# Patient Record
Sex: MALE | Race: WHITE | Employment: OTHER | URBAN - METROPOLITAN AREA
[De-identification: names, ages, dates, MRNs, and addresses within clinical notes are randomized per-mention and may not be internally consistent; named-entity substitution may affect disease eponyms.]

---

## 2018-05-27 ENCOUNTER — HOSPITAL ENCOUNTER (EMERGENCY)
Age: 58
Discharge: HOME | End: 2018-05-27
Attending: EMERGENCY MEDICINE
Payer: COMMERCIAL

## 2018-05-27 ENCOUNTER — APPOINTMENT (OUTPATIENT)
Dept: GENERAL RADIOLOGY | Age: 58
End: 2018-05-27
Payer: COMMERCIAL

## 2018-05-27 VITALS
RESPIRATION RATE: 16 BRPM | DIASTOLIC BLOOD PRESSURE: 86 MMHG | OXYGEN SATURATION: 95 % | HEART RATE: 68 BPM | BODY MASS INDEX: 39.96 KG/M2 | HEIGHT: 72 IN | SYSTOLIC BLOOD PRESSURE: 162 MMHG | TEMPERATURE: 98.3 F | WEIGHT: 295 LBS

## 2018-05-27 DIAGNOSIS — S86.911A STRAIN OF RIGHT KNEE, INITIAL ENCOUNTER: ICD-10-CM

## 2018-05-27 DIAGNOSIS — M25.561 ACUTE PAIN OF RIGHT KNEE: Primary | ICD-10-CM

## 2018-05-27 PROCEDURE — 73564 X-RAY EXAM KNEE 4 OR MORE: CPT

## 2018-05-27 PROCEDURE — 99283 EMERGENCY DEPT VISIT LOW MDM: CPT

## 2018-05-27 RX ORDER — NAPROXEN 500 MG/1
500 TABLET ORAL 2 TIMES DAILY
Qty: 30 TABLET | Refills: 0 | Status: SHIPPED | OUTPATIENT
Start: 2018-05-27 | End: 2018-06-11

## 2018-05-27 RX ORDER — HYDROCODONE BITARTRATE AND ACETAMINOPHEN 5; 325 MG/1; MG/1
1 TABLET ORAL EVERY 6 HOURS PRN
Qty: 12 TABLET | Refills: 0 | Status: SHIPPED | OUTPATIENT
Start: 2018-05-27 | End: 2018-05-30

## 2018-05-27 ASSESSMENT — PAIN DESCRIPTION - DESCRIPTORS: DESCRIPTORS: DISCOMFORT

## 2018-05-27 ASSESSMENT — PAIN DESCRIPTION - FREQUENCY: FREQUENCY: INTERMITTENT

## 2018-05-27 ASSESSMENT — PAIN SCALES - GENERAL: PAINLEVEL_OUTOF10: 1

## 2018-05-27 ASSESSMENT — PAIN DESCRIPTION - LOCATION: LOCATION: KNEE

## 2018-05-27 ASSESSMENT — PAIN DESCRIPTION - ORIENTATION: ORIENTATION: RIGHT

## 2018-05-28 ASSESSMENT — ENCOUNTER SYMPTOMS
ABDOMINAL PAIN: 0
SHORTNESS OF BREATH: 0
EYE REDNESS: 0
NAUSEA: 0
VOMITING: 0

## 2019-07-20 ENCOUNTER — HOSPITAL ENCOUNTER (EMERGENCY)
Age: 59
Discharge: HOME | End: 2019-07-20
Attending: EMERGENCY MEDICINE
Payer: COMMERCIAL

## 2019-07-20 VITALS
BODY MASS INDEX: 40.63 KG/M2 | SYSTOLIC BLOOD PRESSURE: 134 MMHG | OXYGEN SATURATION: 95 % | DIASTOLIC BLOOD PRESSURE: 82 MMHG | TEMPERATURE: 98.3 F | RESPIRATION RATE: 16 BRPM | WEIGHT: 300 LBS | HEART RATE: 73 BPM | HEIGHT: 72 IN

## 2019-07-20 DIAGNOSIS — R21 RASH AND OTHER NONSPECIFIC SKIN ERUPTION: Primary | ICD-10-CM

## 2019-07-20 PROCEDURE — 6370000000 HC RX 637 (ALT 250 FOR IP): Performed by: EMERGENCY MEDICINE

## 2019-07-20 PROCEDURE — 99282 EMERGENCY DEPT VISIT SF MDM: CPT

## 2019-07-20 RX ORDER — METHYLPREDNISOLONE 4 MG/1
TABLET ORAL
Qty: 1 KIT | Refills: 0 | Status: SHIPPED | OUTPATIENT
Start: 2019-07-20 | End: 2019-07-26

## 2019-07-20 RX ORDER — CEPHALEXIN 500 MG/1
500 CAPSULE ORAL 4 TIMES DAILY
Qty: 40 CAPSULE | Refills: 0 | Status: SHIPPED | OUTPATIENT
Start: 2019-07-20 | End: 2019-07-30

## 2019-07-20 RX ORDER — CEPHALEXIN 500 MG/1
500 CAPSULE ORAL ONCE
Status: COMPLETED | OUTPATIENT
Start: 2019-07-20 | End: 2019-07-20

## 2019-07-20 RX ORDER — PREDNISONE 20 MG/1
60 TABLET ORAL ONCE
Status: COMPLETED | OUTPATIENT
Start: 2019-07-20 | End: 2019-07-20

## 2019-07-20 RX ADMIN — PREDNISONE 60 MG: 20 TABLET ORAL at 07:40

## 2019-07-20 RX ADMIN — CEPHALEXIN 500 MG: 500 CAPSULE ORAL at 07:41

## 2019-07-20 ASSESSMENT — PAIN DESCRIPTION - DESCRIPTORS: DESCRIPTORS: CONSTANT;DISCOMFORT;SORE

## 2019-07-20 ASSESSMENT — PAIN DESCRIPTION - PROGRESSION: CLINICAL_PROGRESSION: NOT CHANGED

## 2019-07-20 ASSESSMENT — PAIN DESCRIPTION - PAIN TYPE: TYPE: ACUTE PAIN

## 2019-07-20 ASSESSMENT — PAIN DESCRIPTION - FREQUENCY: FREQUENCY: CONTINUOUS

## 2019-07-20 ASSESSMENT — PAIN DESCRIPTION - LOCATION: LOCATION: HAND

## 2019-07-20 ASSESSMENT — PAIN DESCRIPTION - ONSET: ONSET: GRADUAL

## 2019-07-20 ASSESSMENT — PAIN DESCRIPTION - ORIENTATION: ORIENTATION: LEFT

## 2020-08-21 ENCOUNTER — APPOINTMENT (OUTPATIENT)
Dept: GENERAL RADIOLOGY | Age: 60
End: 2020-08-21
Payer: COMMERCIAL

## 2020-08-21 ENCOUNTER — HOSPITAL ENCOUNTER (EMERGENCY)
Age: 60
Discharge: HOME | End: 2020-08-21
Payer: COMMERCIAL

## 2020-08-21 VITALS
TEMPERATURE: 97.6 F | DIASTOLIC BLOOD PRESSURE: 76 MMHG | HEART RATE: 84 BPM | OXYGEN SATURATION: 98 % | SYSTOLIC BLOOD PRESSURE: 134 MMHG | WEIGHT: 280 LBS | RESPIRATION RATE: 16 BRPM | BODY MASS INDEX: 37.93 KG/M2 | HEIGHT: 72 IN

## 2020-08-21 PROCEDURE — 99284 EMERGENCY DEPT VISIT MOD MDM: CPT

## 2020-08-21 PROCEDURE — U0003 INFECTIOUS AGENT DETECTION BY NUCLEIC ACID (DNA OR RNA); SEVERE ACUTE RESPIRATORY SYNDROME CORONAVIRUS 2 (SARS-COV-2) (CORONAVIRUS DISEASE [COVID-19]), AMPLIFIED PROBE TECHNIQUE, MAKING USE OF HIGH THROUGHPUT TECHNOLOGIES AS DESCRIBED BY CMS-2020-01-R: HCPCS

## 2020-08-21 PROCEDURE — 96372 THER/PROPH/DIAG INJ SC/IM: CPT

## 2020-08-21 PROCEDURE — 71045 X-RAY EXAM CHEST 1 VIEW: CPT

## 2020-08-21 PROCEDURE — 6370000000 HC RX 637 (ALT 250 FOR IP): Performed by: EMERGENCY MEDICINE

## 2020-08-21 PROCEDURE — 6370000000 HC RX 637 (ALT 250 FOR IP): Performed by: NURSE PRACTITIONER

## 2020-08-21 PROCEDURE — 6360000002 HC RX W HCPCS: Performed by: EMERGENCY MEDICINE

## 2020-08-21 RX ORDER — ALBUTEROL SULFATE 90 UG/1
2 AEROSOL, METERED RESPIRATORY (INHALATION) 4 TIMES DAILY PRN
Qty: 1 INHALER | Refills: 0 | Status: SHIPPED | OUTPATIENT
Start: 2020-08-21

## 2020-08-21 RX ORDER — PREDNISONE 20 MG/1
60 TABLET ORAL ONCE
Status: COMPLETED | OUTPATIENT
Start: 2020-08-21 | End: 2020-08-21

## 2020-08-21 RX ORDER — MONTELUKAST SODIUM 10 MG/1
10 TABLET ORAL ONCE
Status: COMPLETED | OUTPATIENT
Start: 2020-08-21 | End: 2020-08-21

## 2020-08-21 RX ORDER — IPRATROPIUM BROMIDE AND ALBUTEROL SULFATE 2.5; .5 MG/3ML; MG/3ML
2 SOLUTION RESPIRATORY (INHALATION) ONCE
Status: DISCONTINUED | OUTPATIENT
Start: 2020-08-21 | End: 2020-08-21

## 2020-08-21 RX ORDER — PREDNISONE 10 MG/1
TABLET ORAL
Qty: 30 TABLET | Refills: 0 | Status: SHIPPED | OUTPATIENT
Start: 2020-08-21 | End: 2020-08-31

## 2020-08-21 RX ORDER — TERBUTALINE SULFATE 1 MG/ML
0.25 INJECTION, SOLUTION SUBCUTANEOUS ONCE
Status: COMPLETED | OUTPATIENT
Start: 2020-08-21 | End: 2020-08-21

## 2020-08-21 RX ADMIN — MONTELUKAST SODIUM 10 MG: 10 TABLET, FILM COATED ORAL at 13:08

## 2020-08-21 RX ADMIN — TERBUTALINE SULFATE 0.25 MG: 1 INJECTION SUBCUTANEOUS at 13:07

## 2020-08-21 RX ADMIN — PREDNISONE 60 MG: 20 TABLET ORAL at 12:18

## 2020-08-21 NOTE — ED PROVIDER NOTES
[facility]     Department of Emergency Medicine   ED  Provider Note  Admit Date/RoomTime: 8/21/2020 11:06 AM  ED Room: 31/31   Chief Complaint:   Concern For COVID-19 (states he has generalized body aches, camacho to lower back. denies fever, chest pain, cough. called PCP and was told to come to ER to be evaluated )    History of Present Illness   Source of history provided by:  patient. History/Exam Limitations: none. Sandra Aguila is a 60 y.o. old male who has a past medical Hx of:   Past Medical History:   Diagnosis Date    Gout     Hyperlipidemia     Hypertension     Sleep apnea     presents to the emergency department by private vehicle, for fever, which began 1 day(s) prior to arrival.  The fever is described as: low grade fevers and chills and measured orally. Since onset the symptoms have been stable. His symptoms are associated with wheezing. There has been NO history of cough, dizziness, nasal congestion or nausea. He has been treated with nothing prior to arrival.  Patient states he was sent in by his family doctor to rule out COVID-19. N/A  ROS    Pertinent positives and negatives are stated within HPI, all other systems reviewed and are negative. Past Surgical History:   Procedure Laterality Date    ANUS SURGERY      anal fistula repair    FRACTURE SURGERY      right lower leg    NASAL SINUS SURGERY      x 6    QUADRACEPS TENDON REPAIR     Social History:  reports that he has never smoked. He has never used smokeless tobacco. He reports that he does not drink alcohol or use drugs. Family History: family history is not on file.   Allergies: Minocycline    Physical Exam           ED Triage Vitals   BP Temp Temp src Pulse Resp SpO2 Height Weight   08/21/20 1114 08/21/20 1105 -- 08/21/20 1105 08/21/20 1114 08/21/20 1105 08/21/20 1114 08/21/20 1114   (!) 146/73 97.3 °F (36.3 °C)  96 20 97 % 6' (1.829 m) 280 lb (127 kg)     Oxygen Saturation Interpretation: Normal.    Constitutional: Alert, development consistent with age. HEENT:  NC/NT. Airway patent. Neck:  Normal ROM. Supple. Respiratory:  Clear to auscultation and breath sounds equal, slight expiratory wheezes noted bilaterally  CV:  Regular rate and rhythm, normal heart sounds, without pathological murmurs, ectopy, gallops, or rubs. GI: Abdomen Soft, nontender, good bowel sounds. No firm or pulsatile mass. Back:  No costovertebral tenderness. Integument:  Normal turgor. Warm, dry, without visible rash, unless noted elsewhere. Lymphatic: no lymphadenopathy noted  Neurological:  Oriented. Motor functions intact. Lab / Imaging Results   (All laboratory and radiology results have been personally reviewed by myself)  Labs:  Results for orders placed or performed during the hospital encounter of 08/21/20   Covid-19 Ambulatory   Result Value Ref Range    Source OP swab      Imaging: All Radiology results interpreted by Radiologist unless otherwise noted. XR CHEST PORTABLE   Final Result      NO ACUTE CARDIOPULMONARY PROCESS               ED Course / Medical Decision Making     Medications   predniSONE (DELTASONE) tablet 60 mg (60 mg Oral Given 8/21/20 1218)   terbutaline (BRETHINE) injection 0.25 mg (0.25 mg Subcutaneous Given 8/21/20 1307)   montelukast (SINGULAIR) tablet 10 mg (10 mg Oral Given 8/21/20 1308)        Re-Evaluations:  8/21/20      Time:     Patients symptoms are improving. Consultations:             None    Procedures:   none    MDM: Patient is a 79-year-old male came to the emergency department with complaints of a fever and chills that he has had since yesterday. Patient's chest x-ray showed no acute cardiopulmonary process. Patient was given terbutaline as well as Singulair and prednisone in the emergency department. He does have a pending COVID-19 test at this time. He will be discharged and is to follow-up his primary care provider.   Instructions were given to patient to self quarantine himself until his COVID-19 test results are back. I did inform him he started experiencing any type of shortness of breath that he is to return to emergency room immediately. Counseling:   I have spoken with the patient and discussed todays results, in addition to providing specific details for the plan of care and counseling regarding the diagnosis and prognosis and are agreeable with the plan to be discharged. Assessment      1. Fever, unspecified fever cause      This patient's ED course included: a personal history and physicial examination    This patient has remained hemodynamically stable during their ED course. Plan   Discharge to home. Patient condition is good. New Medications     Discharge Medication List as of 8/21/2020  1:01 PM      START taking these medications    Details   predniSONE (DELTASONE) 10 MG tablet 5 tablets by mouth once a day x 2 days, then 4 tablets by mouth once a day x 2 days, then 3 tablets by mouth once a day x 2 days, then 2 tablets by mouth once a day x 2 days, then 1 tablets by mouth once a day x 2 days then stop., Disp-30 tablet,R-0Prin t      albuterol sulfate HFA (VENTOLIN HFA) 108 (90 Base) MCG/ACT inhaler Inhale 2 puffs into the lungs 4 times daily as needed for Wheezing, Disp-1 Inhaler,R-0Print           Electronically signed by LAURA Ritchie NP   DD: 8/21/20  **This report was transcribed using voice recognition software. Every effort was made to ensure accuracy; however, inadvertent computerized transcription errors may be present.   END OF PROVIDER NOTE        LAURA Howard NP  08/21/20 7549

## 2020-08-21 NOTE — ED NOTES
Pt alert and oriented x4. Speech clear. Respirations easy/unlabored. Skin warm/dry. Appropriate color. No signs of acute distress noted. Pt/family teaching provided; verbalized understanding. Pt stable for discharge.        Adia Anne RN  08/21/20 6553

## 2020-08-22 ENCOUNTER — CARE COORDINATION (OUTPATIENT)
Dept: CARE COORDINATION | Age: 60
End: 2020-08-22

## 2020-08-22 LAB
SARS-COV-2: NOT DETECTED
SOURCE: NORMAL

## 2020-08-22 NOTE — CARE COORDINATION
Patient contacted regarding Marce Vidal. Discussed COVID-19 related testing which was pending at this time. Test results were pending. Patient informed of results, if available? pending    Care Transition Nurse/ Ambulatory Care Manager contacted the patient by telephone to perform post discharge assessment. Verified name and  with patient as identifiers. Provided introduction to self, and explanation of the CTN/ACM role, and reason for call due to risk factors for infection and/or exposure to COVID-19. Symptoms reviewed with patient who verbalized the following symptoms: pain or aching joints, nausea, no new symptoms and no worsening symptoms. ACM discussed that pt is self isolate until he receives his COVID test results, verbalized understanding. Due to no new or worsening symptoms encounter was not routed to provider for escalation. Discussed follow-up appointments. If no appointment was previously scheduled, appointment scheduling offered: Yes  [facility] follow up appointment(s): No future appointments. Non-[facility] follow up appointment(s): Reports that he has been in contact with his PCP as he is who instructed pt to be evaluated in ED. He will contact him on Monday for f/u and inform of his COVID test pending. Advance Care Planning:   Does patient have an Advance Directive:  reviewed and current. Patient has following risk factors of: asthma, diabetes and HTN. CTN/ACM reviewed discharge instructions, medical action plan and red flags such as increased shortness of breath, increasing fever and signs of decompensation with patient who verbalized understanding. Discussed exposure protocols and quarantine with CDC Guidelines What to do if you are sick with coronavirus disease .  Patient was given an opportunity for questions and concerns.  The patient agrees to contact the [facility] exposure line [phone], local health department [place] Department of Health: ([phone]) and PCP office for

## 2020-08-26 ENCOUNTER — TELEPHONE (OUTPATIENT)
Dept: ADMINISTRATIVE | Age: 60
End: 2020-08-26

## 2022-10-12 ENCOUNTER — OFFICE VISIT (OUTPATIENT)
Dept: ENT CLINIC | Age: 62
End: 2022-10-12
Payer: COMMERCIAL

## 2022-10-12 ENCOUNTER — PROCEDURE VISIT (OUTPATIENT)
Dept: AUDIOLOGY | Age: 62
End: 2022-10-12
Payer: COMMERCIAL

## 2022-10-12 VITALS
BODY MASS INDEX: 35.21 KG/M2 | SYSTOLIC BLOOD PRESSURE: 130 MMHG | DIASTOLIC BLOOD PRESSURE: 83 MMHG | OXYGEN SATURATION: 98 % | WEIGHT: 260 LBS | HEART RATE: 68 BPM | HEIGHT: 72 IN | TEMPERATURE: 99 F

## 2022-10-12 DIAGNOSIS — H90.3 SENSORINEURAL HEARING LOSS (SNHL) OF BOTH EARS: Primary | ICD-10-CM

## 2022-10-12 DIAGNOSIS — J33.9 NASAL POLYPOSIS: ICD-10-CM

## 2022-10-12 PROCEDURE — 92567 TYMPANOMETRY: CPT | Performed by: AUDIOLOGIST

## 2022-10-12 PROCEDURE — 92557 COMPREHENSIVE HEARING TEST: CPT | Performed by: AUDIOLOGIST

## 2022-10-12 PROCEDURE — 99204 OFFICE O/P NEW MOD 45 MIN: CPT | Performed by: OTOLARYNGOLOGY

## 2022-10-12 RX ORDER — SEMAGLUTIDE 1.34 MG/ML
INJECTION, SOLUTION SUBCUTANEOUS
COMMUNITY
Start: 2021-06-01

## 2022-10-12 RX ORDER — LISINOPRIL AND HYDROCHLOROTHIAZIDE 20; 12.5 MG/1; MG/1
TABLET ORAL
COMMUNITY
Start: 2022-09-14

## 2022-10-12 ASSESSMENT — ENCOUNTER SYMPTOMS
EYE DISCHARGE: 0
APNEA: 0
VOMITING: 0
GASTROINTESTINAL NEGATIVE: 1
COLOR CHANGE: 0
SHORTNESS OF BREATH: 0
EYE PAIN: 0
EYES NEGATIVE: 1
DIARRHEA: 0
RESPIRATORY NEGATIVE: 1
ABDOMINAL PAIN: 0
CHEST TIGHTNESS: 0

## 2022-10-12 NOTE — PROGRESS NOTES
This patient was referred for audiometric/tympanometric testing by Dr. Cherrie Beck due to hearing loss that happened about six weeks ago in his right ear. Patient stated that he felt his hearing in his right ear now feels distorted, especially with female voices. Patient denied any tinnitus, pain, pressure, drainage, or dizziness. 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 Audiometry using pure tone air and bone conduction revealed moderate rising to within normal limits at 1000 Hz sloping to moderately-severe sensorineural hearing loss in the right ear and hearing within normal limits through 1000 Hz sloping to a moderately-severe sensorineural hearing loss in the left ear. Asymmetric hearing loss noted and discussed with physician. Reliability was good. Speech reception thresholds were in good agreement with the pure tone averages, bilaterally. Speech discrimination scores were excellent, bilaterally. Tympanometry revealed normal middle ear peak pressure and compliance, bilaterally. The results were reviewed with the patient and physician. Recommendations for follow up will be made pending physician consult. LIZABETH Campos.   Audiology Intern (4th Year)     Rosa Leary, Christine/[facility]  [facility] Lic # [id number]

## 2022-10-12 NOTE — PROGRESS NOTES
Subjective:      Patient ID:  Edwina Choudhury is a 60 y.o. male. HPI:    Patient presents today for right sided hearing change. Condition has been present for 6 week(s). Pt states it happened all of a sudden. He thinks the tones are not lost but distorted. It has gotten better in the last 6 weeks but still present. Pt never had this before. No history of ear issues in the past, no surgery. Pt has allergies and takes allegra. Pt takes flonase at times but not consistently. 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 Pt has had multiple surgeries for nasal polyps and is having anosmia. Past Medical History:   Diagnosis Date    Gout     Hyperlipidemia     Hypertension     Sleep apnea      Past Surgical History:   Procedure Laterality Date    ANUS SURGERY      anal fistula repair    FRACTURE SURGERY      right lower leg    NASAL SINUS SURGERY      x 6    QUADRACEPS TENDON REPAIR       History reviewed. No pertinent family history. Social History     Socioeconomic History    Marital status:      Spouse name: None    Number of children: None    Years of education: None    Highest education level: None   Tobacco Use    Smoking status: Never    Smokeless tobacco: Never   Substance and Sexual Activity    Alcohol use: No     Comment: rarely    Drug use: No    Sexual activity: Yes     Partners: Female     Allergies   Allergen Reactions    Minocycline Hives       Review of Systems   Constitutional: Negative. Negative for appetite change. HENT:  Positive for hearing loss. Eyes: Negative. Negative for pain, discharge and visual disturbance. Respiratory: Negative. Negative for apnea, chest tightness and shortness of breath. Cardiovascular: Negative. Negative for chest pain, palpitations and leg swelling. Gastrointestinal: Negative. Negative for abdominal pain, diarrhea and vomiting. Endocrine: Negative for cold intolerance, heat intolerance and polydipsia.    Genitourinary: Negative. Negative for dysuria, flank pain and hematuria. Musculoskeletal: Negative. Negative for arthralgias, gait problem and neck pain. Skin: Negative. Negative for color change, pallor and rash. Allergic/Immunologic: Negative for environmental allergies, food allergies and immunocompromised state. Neurological: Negative. Negative for dizziness, numbness and headaches. Hematological:  Negative for adenopathy. Psychiatric/Behavioral: Negative. Negative for behavioral problems and hallucinations. All other systems reviewed and are negative. Objective:     Vitals:    10/12/22 1121   BP: 130/83   Pulse: 68   Temp: 99 °F (37.2 °C)   SpO2: 98%     Physical Exam  Vitals and nursing note reviewed. Constitutional:       Appearance: He is well-developed. HENT:      Head: Normocephalic and atraumatic. Nose: Nose normal.      Mouth/Throat:      Pharynx: Uvula midline. Eyes:      Conjunctiva/sclera: Conjunctivae normal.      Pupils: Pupils are equal, round, and reactive to light. Cardiovascular:      Rate and Rhythm: Normal rate and regular rhythm. Heart sounds: Normal heart sounds. Pulmonary:      Effort: Pulmonary effort is normal.      Breath sounds: Normal breath sounds. Abdominal:      General: Bowel sounds are normal.      Palpations: Abdomen is soft. Musculoskeletal:      Cervical back: Normal range of motion and neck supple. Skin:     General: Skin is warm and dry. Neurological:      Mental Status: He is alert and oriented to person, place, and time. Audio:              Assessment:       Diagnosis Orders   1. Sensorineural hearing loss (SNHL) of both ears                   Plan:      Hearing is diminished. Pt does not have hearing aids at this time. Pt is not a candidate for hearing aids and is not interested in discussing this with audiology. Also discussed the importance of hearing protection from now on to preserve remaining hearing.      Call or return to clinic prn if these symptoms worsen or fail to improve as anticipated    Follow up in 1 year(s)    Discussed nasal issues in the past. I will get a CT of the sinues to evaluate the previous surgery anatomy.

## 2023-09-06 ENCOUNTER — TELEPHONE (OUTPATIENT)
Dept: ENT CLINIC | Age: 63
End: 2023-09-06

## 2023-09-06 NOTE — TELEPHONE ENCOUNTER
Reached out to patient to discuss ordered CT sinus without contrast. Patient last seen 10/12/22, Is scheduled for a follow up 10/13/23. Patient states he is unsure if he wants to get the imaging done, due to insurance coverage, but that he is going to get some quotes on pricing and return a call to the office by Friday this week to let us know if he is interested in getting his imaging done or if he needs to reschedule his follow up.   Electronically signed by Cem Berrios LPN on 3/5/8861 at 62:56 AM

## 2023-09-07 ENCOUNTER — TELEPHONE (OUTPATIENT)
Dept: ENT CLINIC | Age: 63
End: 2023-09-07

## 2023-09-07 NOTE — TELEPHONE ENCOUNTER
Patient requesting CT sinus Rx to be faxed to [facility].  Please advise patient [phone]

## 2023-09-11 NOTE — TELEPHONE ENCOUNTER
CT scan has been authorized and faxed to [facility] 9/7/23. Patient advised and states he is scheduled 9/20/23.   Authorization: [id number]  Expiration: 11/5/23    Electronically signed by Gera Adams MA on 9/11/23 at 8:22 AM EDT

## 2024-02-02 ENCOUNTER — TELEPHONE (OUTPATIENT)
Dept: ENT CLINIC | Age: 64
End: 2024-02-02

## 2024-02-02 NOTE — TELEPHONE ENCOUNTER
Galilea from [facility] called regarding imaging pt had done on 9/20/2023.she said the authorization was incorrect. Please advise. Electronically signed by Noemí Ocampo on 2/2/2024 at 1:56 PM

## 2024-02-05 NOTE — TELEPHONE ENCOUNTER
Called and spoke with [facility] per [facility] billing a denial was presented due to members insurance being termed at time of service. I will wait for updated card to be faxed and attempt a retro via [facility].